# Patient Record
Sex: MALE | Race: BLACK OR AFRICAN AMERICAN | NOT HISPANIC OR LATINO | Employment: OTHER | URBAN - METROPOLITAN AREA
[De-identification: names, ages, dates, MRNs, and addresses within clinical notes are randomized per-mention and may not be internally consistent; named-entity substitution may affect disease eponyms.]

---

## 2023-08-23 ENCOUNTER — OFFICE VISIT (OUTPATIENT)
Dept: INTERNAL MEDICINE | Facility: CLINIC | Age: 68
End: 2023-08-23
Payer: MEDICARE

## 2023-08-23 VITALS
HEART RATE: 53 BPM | HEIGHT: 71 IN | DIASTOLIC BLOOD PRESSURE: 72 MMHG | BODY MASS INDEX: 32.07 KG/M2 | WEIGHT: 229.06 LBS | TEMPERATURE: 96 F | OXYGEN SATURATION: 99 % | SYSTOLIC BLOOD PRESSURE: 146 MMHG

## 2023-08-23 DIAGNOSIS — I10 PRIMARY HYPERTENSION: Primary | Chronic | ICD-10-CM

## 2023-08-23 DIAGNOSIS — M1A.9XX0 CHRONIC GOUT INVOLVING TOE WITHOUT TOPHUS, UNSPECIFIED CAUSE, UNSPECIFIED LATERALITY: Chronic | ICD-10-CM

## 2023-08-23 DIAGNOSIS — J30.9 ALLERGIC RHINITIS, UNSPECIFIED SEASONALITY, UNSPECIFIED TRIGGER: Chronic | ICD-10-CM

## 2023-08-23 DIAGNOSIS — E78.5 HYPERLIPIDEMIA, UNSPECIFIED HYPERLIPIDEMIA TYPE: Chronic | ICD-10-CM

## 2023-08-23 PROCEDURE — 99999 PR PBB SHADOW E&M-NEW PATIENT-LVL IV: ICD-10-PCS | Mod: PBBFAC,,,

## 2023-08-23 PROCEDURE — 99204 PR OFFICE/OUTPT VISIT, NEW, LEVL IV, 45-59 MIN: ICD-10-PCS | Mod: S$PBB,,, | Performed by: FAMILY MEDICINE

## 2023-08-23 PROCEDURE — 99999 PR PBB SHADOW E&M-NEW PATIENT-LVL IV: CPT | Mod: PBBFAC,,,

## 2023-08-23 PROCEDURE — 99204 OFFICE O/P NEW MOD 45 MIN: CPT | Mod: S$PBB,,, | Performed by: FAMILY MEDICINE

## 2023-08-23 PROCEDURE — 99204 OFFICE O/P NEW MOD 45 MIN: CPT | Mod: PBBFAC,PO | Performed by: FAMILY MEDICINE

## 2023-08-23 RX ORDER — ROSUVASTATIN CALCIUM 10 MG/1
10 TABLET, COATED ORAL DAILY
Qty: 90 TABLET | Refills: 2 | Status: SHIPPED | OUTPATIENT
Start: 2023-08-23 | End: 2024-02-23 | Stop reason: SDUPTHER

## 2023-08-23 RX ORDER — FLUTICASONE PROPIONATE 50 MCG
1 SPRAY, SUSPENSION (ML) NASAL
COMMUNITY

## 2023-08-23 RX ORDER — MULTIVIT-MINERALS/FOLIC ACID 120 MCG
2500 TABLET,CHEWABLE ORAL DAILY
COMMUNITY
Start: 2015-01-01

## 2023-08-23 RX ORDER — ROSUVASTATIN CALCIUM 10 MG/1
10 TABLET, COATED ORAL DAILY
COMMUNITY
Start: 2013-01-01 | End: 2023-08-23 | Stop reason: SDUPTHER

## 2023-08-23 RX ORDER — AMLODIPINE AND OLMESARTAN MEDOXOMIL 10; 40 MG/1; MG/1
1 TABLET ORAL DAILY
COMMUNITY
Start: 2023-04-11 | End: 2023-08-23 | Stop reason: SDUPTHER

## 2023-08-23 RX ORDER — ACETAMINOPHEN 500 MG
TABLET ORAL DAILY
COMMUNITY
Start: 2015-01-01

## 2023-08-23 RX ORDER — SPIRONOLACTONE 25 MG/1
25 TABLET ORAL DAILY
Qty: 90 TABLET | Refills: 2 | Status: SHIPPED | OUTPATIENT
Start: 2023-08-23 | End: 2024-02-23 | Stop reason: SDUPTHER

## 2023-08-23 RX ORDER — AMLODIPINE AND OLMESARTAN MEDOXOMIL 10; 40 MG/1; MG/1
1 TABLET ORAL DAILY
Qty: 90 TABLET | Refills: 2 | Status: SHIPPED | OUTPATIENT
Start: 2023-08-23 | End: 2024-02-23 | Stop reason: SDUPTHER

## 2023-08-23 RX ORDER — NEBIVOLOL 10 MG/1
10 TABLET ORAL DAILY
Qty: 90 TABLET | Refills: 2 | Status: SHIPPED | OUTPATIENT
Start: 2023-08-23

## 2023-08-23 RX ORDER — ASCORBIC ACID 250 MG
250 TABLET ORAL DAILY
COMMUNITY

## 2023-08-23 RX ORDER — SPIRONOLACTONE 25 MG/1
25 TABLET ORAL DAILY
COMMUNITY
Start: 2023-04-11 | End: 2023-08-23 | Stop reason: SDUPTHER

## 2023-08-23 RX ORDER — NEBIVOLOL 10 MG/1
10 TABLET ORAL DAILY
COMMUNITY
Start: 2023-04-11 | End: 2023-08-23 | Stop reason: SDUPTHER

## 2023-08-23 NOTE — PROGRESS NOTES
Subjective     Patient ID: Lorenzo Hou Sr. is a 68 y.o. male.    Chief Complaint: [facility]    69 yo male presents to establish care.  Patient with history of htn, hlp and gout. Patient reports no recent gout episodes but not eating shellfish, has tingling every now and then and if he stubs his toe it will hurt. Patient states he massages his foot with cbd oil  Review of Systems   Constitutional: Negative.    HENT: Negative.     Eyes: Negative.    Respiratory: Negative.     Cardiovascular: Negative.    Gastrointestinal: Negative.    Musculoskeletal: Negative.    Neurological: Negative.    Psychiatric/Behavioral: Negative.            Objective     Physical Exam  Vitals and nursing note reviewed.   Constitutional:       Appearance: Normal appearance.   HENT:      Head: Normocephalic and atraumatic.   Eyes:      Extraocular Movements: Extraocular movements intact.   Cardiovascular:      Rate and Rhythm: Normal rate and regular rhythm.   Pulmonary:      Effort: Pulmonary effort is normal.      Breath sounds: Normal breath sounds.   Abdominal:      General: Abdomen is flat. Bowel sounds are normal.      Palpations: Abdomen is soft.   Musculoskeletal:      Cervical back: Normal range of motion and neck supple.      Right lower leg: Edema present.      Left lower leg: Edema present.      Comments: Trace edema   Skin:     General: Skin is warm and dry.   Neurological:      General: No focal deficit present.      Mental Status: He is alert and oriented to person, place, and time.   Psychiatric:         Mood and Affect: Mood normal.         Behavior: Behavior normal.          Assessment and Plan     1. Primary hypertension  Comments:  bp STABLE. refill meds today cont current tx with bystolic and aldactone  Orders:  -     spironolactone (ALDACTONE) 25 MG tablet; Take 1 tablet (25 mg total) by mouth Daily.  Dispense: 90 tablet; Refill: 2  -     nebivoloL (BYSTOLIC) 10 MG Tab; Take 1 tablet (10 mg total) by mouth once  daily.  Dispense: 90 tablet; Refill: 2  -     amlodipine-olmesartan (BRIGIDA) 10-40 mg per tablet; Take 1 tablet by mouth once daily.  Dispense: 90 tablet; Refill: 2    2. Hyperlipidemia, unspecified hyperlipidemia type  Comments:  on statin therapy with crestor. per patient labs were good with cards  Orders:  -     rosuvastatin (CRESTOR) 10 MG tablet; Take 1 tablet (10 mg total) by mouth Daily.  Dispense: 90 tablet; Refill: 2    3. Allergic rhinitis, unspecified seasonality, unspecified trigger  Comments:  controlled with nasal spray refilled today  Orders:  -     cetirizine 10 mg Cap; Take 10 mg by mouth daily as needed (allergies).  Dispense: 90 capsule; Refill: 2    4. Chronic gout involving toe without tophus, unspecified cause, unspecified laterality  Comments:  pt states controlled with avoiding shellfish. has tingling on occassion but no severe flare ups              Follow up in about 6 months (around 2/23/2024).

## 2023-08-28 PROBLEM — E78.5 HYPERLIPIDEMIA: Status: ACTIVE | Noted: 2023-08-28

## 2023-08-28 PROBLEM — M1A.9XX0 CHRONIC GOUT INVOLVING TOE WITHOUT TOPHUS: Status: ACTIVE | Noted: 2023-08-28

## 2023-08-28 PROBLEM — I10 PRIMARY HYPERTENSION: Status: ACTIVE | Noted: 2023-08-28

## 2023-08-28 PROBLEM — J30.9 ALLERGIC RHINITIS: Status: ACTIVE | Noted: 2023-08-28

## 2023-08-31 ENCOUNTER — PATIENT MESSAGE (OUTPATIENT)
Dept: INTERNAL MEDICINE | Facility: CLINIC | Age: 68
End: 2023-08-31
Payer: MEDICARE

## 2024-02-21 DIAGNOSIS — I10 PRIMARY HYPERTENSION: ICD-10-CM

## 2024-02-23 ENCOUNTER — OFFICE VISIT (OUTPATIENT)
Dept: INTERNAL MEDICINE | Facility: CLINIC | Age: 69
End: 2024-02-23
Payer: MEDICARE

## 2024-02-23 DIAGNOSIS — E66.09 CLASS 1 OBESITY DUE TO EXCESS CALORIES WITH SERIOUS COMORBIDITY AND BODY MASS INDEX (BMI) OF 32.0 TO 32.9 IN ADULT: ICD-10-CM

## 2024-02-23 DIAGNOSIS — M1A.9XX0 CHRONIC GOUT INVOLVING TOE WITHOUT TOPHUS, UNSPECIFIED CAUSE, UNSPECIFIED LATERALITY: Chronic | ICD-10-CM

## 2024-02-23 DIAGNOSIS — E78.5 HYPERLIPIDEMIA, UNSPECIFIED HYPERLIPIDEMIA TYPE: Chronic | ICD-10-CM

## 2024-02-23 DIAGNOSIS — R00.1 BRADYCARDIA: Chronic | ICD-10-CM

## 2024-02-23 DIAGNOSIS — I10 PRIMARY HYPERTENSION: Primary | Chronic | ICD-10-CM

## 2024-02-23 PROCEDURE — 99213 OFFICE O/P EST LOW 20 MIN: CPT | Mod: 95,,, | Performed by: FAMILY MEDICINE

## 2024-02-23 RX ORDER — ROSUVASTATIN CALCIUM 10 MG/1
10 TABLET, COATED ORAL DAILY
Qty: 90 TABLET | Refills: 3 | Status: SHIPPED | OUTPATIENT
Start: 2024-02-23

## 2024-02-23 RX ORDER — AMLODIPINE AND OLMESARTAN MEDOXOMIL 10; 40 MG/1; MG/1
1 TABLET ORAL DAILY
Qty: 90 TABLET | Refills: 3 | Status: SHIPPED | OUTPATIENT
Start: 2024-02-23

## 2024-02-23 RX ORDER — SPIRONOLACTONE 25 MG/1
25 TABLET ORAL DAILY
Qty: 90 TABLET | Refills: 3 | Status: SHIPPED | OUTPATIENT
Start: 2024-02-23

## 2024-02-23 NOTE — PROGRESS NOTES
Subjective     Patient ID: Lorenzo Hou Sr. is a 69 y.o. male.    The patient location is: home  The chief complaint leading to consultation is: follow up    Visit type: audiovisual    Face to Face time with patient: 12  25 minutes of total time spent on the encounter, which includes face to face time and non-face to face time preparing to see the patient (eg, review of tests), Obtaining and/or reviewing separately obtained history, Documenting clinical information in the electronic or other health record, Independently interpreting results (not separately reported) and communicating results to the patient/family/caregiver, or Care coordination (not separately reported).         Each patient to whom he or she provides medical services by telemedicine is:  (1) informed of the relationship between the physician and patient and the respective role of any other health care provider with respect to management of the patient; and (2) notified that he or she may decline to receive medical services by telemedicine and may withdraw from such care at any time.    Notes:    Patient presents for follow up. Patient is doing well. Patient states his heart rate drops to the low 40s on occasion. Not walking as much as before and has been indulging in sweet potato pie. Patient denies any gout flares unless he bumps his toe. He avoids shellfish.    Hypertension  This is a recurrent problem. The current episode started more than 1 year ago. The problem has been resolved since onset. The problem is controlled. Pertinent negatives include no anxiety, blurred vision, chest pain, headaches, malaise/fatigue, neck pain, orthopnea, palpitations, peripheral edema, PND, shortness of breath or sweats. There are no associated agents to hypertension. Risk factors for coronary artery disease include family history. Past treatments include diuretics and lifestyle changes. The current treatment provides significant improvement. Compliance problems  include diet and exercise.      Review of Systems   Constitutional:  Negative for malaise/fatigue.   Eyes:  Negative for blurred vision.   Respiratory:  Negative for shortness of breath.    Cardiovascular:  Negative for chest pain, palpitations, orthopnea and PND.   Musculoskeletal:  Negative for neck pain.   Neurological:  Negative for headaches.          Objective     Physical Exam  Constitutional:       Appearance: Normal appearance.   HENT:      Head: Normocephalic and atraumatic.   Eyes:      Extraocular Movements: Extraocular movements intact.   Pulmonary:      Effort: Pulmonary effort is normal.   Neurological:      General: No focal deficit present.      Mental Status: He is alert.   Psychiatric:         Mood and Affect: Mood normal.            Assessment and Plan     1. Primary hypertension  Comments:  bp STABLE. refill meds today cont current tx with bystolic and aldactone  Orders:  -     spironolactone (ALDACTONE) 25 MG tablet; Take 1 tablet (25 mg total) by mouth Daily.  Dispense: 90 tablet; Refill: 3  -     amlodipine-olmesartan (BRIGIDA) 10-40 mg per tablet; Take 1 tablet by mouth once daily.  Dispense: 90 tablet; Refill: 3    2. Hyperlipidemia, unspecified hyperlipidemia type  Comments:  on statin therapy with crestor. per patient labs were good with cards  Orders:  -     rosuvastatin (CRESTOR) 10 MG tablet; Take 1 tablet (10 mg total) by mouth Daily.  Dispense: 90 tablet; Refill: 3    3. Bradycardia  Comments:  stable and managed by Dr. Robledo    4. Chronic gout involving toe without tophus, unspecified cause, unspecified laterality  Comments:  stable with diet control. patient not on meds by choice. will monitor uric acid levels to determine if meds need to be resumed    5. Class 1 obesity due to excess calories with serious comorbidity and body mass index (BMI) of 32.0 to 32.9 in adult  Comments:  discussed calorie reduction and increasing physical activity                 Follow up in about 3 months  (around 5/23/2024).

## 2024-03-30 VITALS
HEART RATE: 51 BPM | HEIGHT: 71 IN | WEIGHT: 229 LBS | SYSTOLIC BLOOD PRESSURE: 125 MMHG | TEMPERATURE: 98 F | DIASTOLIC BLOOD PRESSURE: 80 MMHG | BODY MASS INDEX: 32.06 KG/M2

## 2024-06-04 ENCOUNTER — PATIENT MESSAGE (OUTPATIENT)
Dept: INTERNAL MEDICINE | Facility: CLINIC | Age: 69
End: 2024-06-04
Payer: MEDICARE

## 2024-06-04 NOTE — TELEPHONE ENCOUNTER
See pt my chart message. Unable to review labs. Please give pt office fax number for office as he states he will fax if labs can't be viewed.//ddw

## 2024-08-26 ENCOUNTER — PATIENT MESSAGE (OUTPATIENT)
Dept: ADMINISTRATIVE | Facility: HOSPITAL | Age: 69
End: 2024-08-26
Payer: MEDICARE

## 2024-08-27 DIAGNOSIS — Z12.11 SCREENING FOR COLON CANCER: ICD-10-CM

## 2024-09-27 ENCOUNTER — PATIENT MESSAGE (OUTPATIENT)
Dept: ADMINISTRATIVE | Facility: HOSPITAL | Age: 69
End: 2024-09-27
Payer: MEDICARE

## 2024-09-30 DIAGNOSIS — Z12.5 SCREENING FOR PROSTATE CANCER: Primary | ICD-10-CM

## 2024-09-30 DIAGNOSIS — Z11.59 NEED FOR HEPATITIS C SCREENING TEST: ICD-10-CM

## 2024-09-30 DIAGNOSIS — Z13.1 SCREENING FOR DIABETES MELLITUS: ICD-10-CM

## 2024-09-30 DIAGNOSIS — E78.5 HYPERLIPIDEMIA, UNSPECIFIED HYPERLIPIDEMIA TYPE: ICD-10-CM

## 2024-09-30 DIAGNOSIS — R79.9 ABNORMAL FINDING OF BLOOD CHEMISTRY, UNSPECIFIED: ICD-10-CM

## 2024-10-01 ENCOUNTER — LAB VISIT (OUTPATIENT)
Dept: LAB | Facility: HOSPITAL | Age: 69
End: 2024-10-01
Attending: FAMILY MEDICINE
Payer: MEDICARE

## 2024-10-01 DIAGNOSIS — Z12.5 SCREENING FOR PROSTATE CANCER: ICD-10-CM

## 2024-10-01 DIAGNOSIS — I10 PRIMARY HYPERTENSION: ICD-10-CM

## 2024-10-01 DIAGNOSIS — E78.5 HYPERLIPIDEMIA, UNSPECIFIED HYPERLIPIDEMIA TYPE: ICD-10-CM

## 2024-10-01 DIAGNOSIS — R79.9 ABNORMAL FINDING OF BLOOD CHEMISTRY, UNSPECIFIED: ICD-10-CM

## 2024-10-01 DIAGNOSIS — Z13.1 SCREENING FOR DIABETES MELLITUS: ICD-10-CM

## 2024-10-01 DIAGNOSIS — Z11.59 NEED FOR HEPATITIS C SCREENING TEST: ICD-10-CM

## 2024-10-01 LAB
ALBUMIN SERPL BCP-MCNC: 4.2 G/DL (ref 3.5–5.2)
ALP SERPL-CCNC: 80 U/L (ref 55–135)
ALT SERPL W/O P-5'-P-CCNC: 19 U/L (ref 10–44)
ANION GAP SERPL CALC-SCNC: 4 MMOL/L (ref 8–16)
AST SERPL-CCNC: 16 U/L (ref 10–40)
BILIRUB SERPL-MCNC: 0.8 MG/DL (ref 0.1–1)
BUN SERPL-MCNC: 11 MG/DL (ref 8–23)
CALCIUM SERPL-MCNC: 9.9 MG/DL (ref 8.7–10.5)
CHLORIDE SERPL-SCNC: 105 MMOL/L (ref 95–110)
CHOLEST SERPL-MCNC: 123 MG/DL (ref 120–199)
CHOLEST/HDLC SERPL: 2.6 {RATIO} (ref 2–5)
CO2 SERPL-SCNC: 28 MMOL/L (ref 23–29)
COMPLEXED PSA SERPL-MCNC: 0.36 NG/ML (ref 0–4)
CREAT SERPL-MCNC: 1 MG/DL (ref 0.5–1.4)
EST. GFR  (NO RACE VARIABLE): >60 ML/MIN/1.73 M^2
ESTIMATED AVG GLUCOSE: 108 MG/DL (ref 68–131)
GLUCOSE SERPL-MCNC: 100 MG/DL (ref 70–110)
HBA1C MFR BLD: 5.4 % (ref 4–5.6)
HCV AB SERPL QL IA: NORMAL
HDLC SERPL-MCNC: 48 MG/DL (ref 40–75)
HDLC SERPL: 39 % (ref 20–50)
LDLC SERPL CALC-MCNC: 65.6 MG/DL (ref 63–159)
NONHDLC SERPL-MCNC: 75 MG/DL
POTASSIUM SERPL-SCNC: 4.6 MMOL/L (ref 3.5–5.1)
PROT SERPL-MCNC: 7.1 G/DL (ref 6–8.4)
SODIUM SERPL-SCNC: 137 MMOL/L (ref 136–145)
TRIGL SERPL-MCNC: 47 MG/DL (ref 30–150)

## 2024-10-01 PROCEDURE — 86803 HEPATITIS C AB TEST: CPT | Performed by: FAMILY MEDICINE

## 2024-10-01 PROCEDURE — 36415 COLL VENOUS BLD VENIPUNCTURE: CPT | Mod: PO | Performed by: FAMILY MEDICINE

## 2024-10-01 PROCEDURE — 83036 HEMOGLOBIN GLYCOSYLATED A1C: CPT | Performed by: FAMILY MEDICINE

## 2024-10-01 PROCEDURE — 80053 COMPREHEN METABOLIC PANEL: CPT | Performed by: FAMILY MEDICINE

## 2024-10-01 PROCEDURE — 80061 LIPID PANEL: CPT | Performed by: FAMILY MEDICINE

## 2024-10-01 PROCEDURE — 84153 ASSAY OF PSA TOTAL: CPT | Performed by: FAMILY MEDICINE

## 2024-10-21 ENCOUNTER — TELEPHONE (OUTPATIENT)
Dept: ADMINISTRATIVE | Facility: HOSPITAL | Age: 69
End: 2024-10-21
Payer: MEDICARE

## 2024-10-21 DIAGNOSIS — Z12.11 COLON CANCER SCREENING: Primary | ICD-10-CM

## 2024-10-21 NOTE — TELEPHONE ENCOUNTER
Lab stated they were unable to process the fitkit due to delay in mailing.  I am going to mail the patient another fitkit and place the order again.    Thanks  Luli Ramon  Panel Care Coordinator   [facility]active [facility] Encounter

## 2024-11-05 ENCOUNTER — OFFICE VISIT (OUTPATIENT)
Dept: URGENT CARE | Facility: CLINIC | Age: 69
End: 2024-11-05
Payer: MEDICARE

## 2024-11-05 ENCOUNTER — TELEPHONE (OUTPATIENT)
Dept: INTERNAL MEDICINE | Facility: CLINIC | Age: 69
End: 2024-11-05
Payer: MEDICARE

## 2024-11-05 VITALS
WEIGHT: 216 LBS | OXYGEN SATURATION: 98 % | HEIGHT: 70 IN | SYSTOLIC BLOOD PRESSURE: 132 MMHG | TEMPERATURE: 99 F | HEART RATE: 69 BPM | BODY MASS INDEX: 30.92 KG/M2 | DIASTOLIC BLOOD PRESSURE: 75 MMHG | RESPIRATION RATE: 16 BRPM

## 2024-11-05 DIAGNOSIS — J34.9 SINUS PROBLEM: Primary | ICD-10-CM

## 2024-11-05 DIAGNOSIS — R05.9 COUGH, UNSPECIFIED TYPE: ICD-10-CM

## 2024-11-05 LAB
CTP QC/QA: YES
SARS-COV-2 AG RESP QL IA.RAPID: NEGATIVE

## 2024-11-05 PROCEDURE — 99214 OFFICE O/P EST MOD 30 MIN: CPT | Mod: S$GLB,,, | Performed by: PHYSICIAN ASSISTANT

## 2024-11-05 PROCEDURE — 87811 SARS-COV-2 COVID19 W/OPTIC: CPT | Mod: QW,S$GLB,, | Performed by: PHYSICIAN ASSISTANT

## 2024-11-05 RX ORDER — BENZONATATE 200 MG/1
200 CAPSULE ORAL 3 TIMES DAILY PRN
Qty: 21 CAPSULE | Refills: 0 | Status: SHIPPED | OUTPATIENT
Start: 2024-11-05 | End: 2024-11-12

## 2024-11-05 NOTE — TELEPHONE ENCOUNTER
"I returned call to pt's wife , Pete. She states that both she and Mr Ventura has bad " colds" She started 5 days ago, and he started 2 days ago. She is improving but still has a deep cough, but Mr Ventura Coughing a lot and he is bringing up a lot of yellow/ green mucous. She informed me that when she saw she couldn't get an appointment today with Dr Mccormick, she went ahead and scheduled an appointment with Urgent Care to be seen today. I agreed with her and told her to let us know if there is anything we can do. Pt agrees.   "

## 2024-11-05 NOTE — PROGRESS NOTES
"Subjective:      Patient ID: Lorenzo Hou Sr. is a 69 y.o. male.    Vitals:  height is 5' 10" (1.778 m) and weight is 98 kg (216 lb). His oral temperature is 99.4 °F (37.4 °C). His blood pressure is 132/75 and his pulse is 69. His respiration is 16 and oxygen saturation is 98%.     Chief Complaint: Cough (Entered by patient)    Pt reports productive cough, congestion, sore throat, post nasal drip x 3 days. Denies known fever.  He denies fever, chills, ear pain, and/or any other symptoms associated with this complaint.    Cough  This is a new problem. The current episode started in the past 7 days. The problem has been gradually worsening. Associated symptoms include headaches, nasal congestion, postnasal drip, rhinorrhea and a sore throat. Pertinent negatives include no chest pain, chills, ear congestion, ear pain, fever, heartburn, hemoptysis, myalgias, rash, shortness of breath, sweats, weight loss or wheezing. Treatments tried: coricidin hbp. The treatment provided mild relief.       Constitution: Negative for chills and fever.   HENT:  Positive for postnasal drip and sore throat. Negative for ear pain.    Cardiovascular:  Negative for chest pain.   Respiratory:  Positive for cough. Negative for bloody sputum, shortness of breath and wheezing.    Gastrointestinal:  Negative for heartburn.   Musculoskeletal:  Negative for muscle ache.   Skin:  Negative for rash.   Neurological:  Positive for headaches.      Objective:     Physical Exam   Constitutional: He is oriented to person, place, and time. He appears well-developed.   HENT:   Head: Normocephalic and atraumatic.   Ears:   Right Ear: External ear normal.   Left Ear: External ear normal.   Nose: Nose normal.   Mouth/Throat: Oropharynx is clear and moist.   Eyes: Conjunctivae, EOM and lids are normal.   Neck: Trachea normal and phonation normal. Neck supple.   Cardiovascular: Normal rate.   Pulmonary/Chest: Effort normal. No respiratory distress. He has no " wheezes. He has no rhonchi.   Musculoskeletal: Normal range of motion.         General: Normal range of motion.   Neurological: He is alert and oriented to person, place, and time.   Skin: Skin is warm, dry and intact.   Psychiatric: His speech is normal and behavior is normal. Judgment and thought content normal.   Nursing note and vitals reviewed.      Assessment:     1. Sinus problem    2. Cough, unspecified type      Results for orders placed or performed in visit on 11/05/24   SARS Coronavirus 2 Antigen, POCT Manual Read    Collection Time: 11/05/24  9:43 AM   Result Value Ref Range    SARS Coronavirus 2 Antigen Negative Negative     Acceptable Yes        Plan:   VSS. Patient non-toxic appearing. Discussed medication being prescribed.  Advised patient to follow up with PCP as needed.  Patient verbalized understanding, agrees with the plan, and is comfortable with discharge.      Sinus problem  -     SARS Coronavirus 2 Antigen, POCT Manual Read    Cough, unspecified type  -     benzonatate (TESSALON) 200 MG capsule; Take 1 capsule (200 mg total) by mouth 3 (three) times daily as needed for Cough.  Dispense: 21 capsule; Refill: 0      Medical Decision Making:   Clinical Tests:   Lab Tests: Ordered and Reviewed       <> Summary of Lab: COVID negative

## 2024-11-05 NOTE — TELEPHONE ENCOUNTER
----- Message from Mirtha sent at 11/5/2024  6:48 AM CST -----  Contact: Pete/ Spouse  .Patient is calling to speak with the nurse regarding appt. Reports needing to come in today if possible due to the pt having a really bad cold. Please give patient a call back at   [phone].

## 2024-11-14 ENCOUNTER — OFFICE VISIT (OUTPATIENT)
Dept: INTERNAL MEDICINE | Facility: CLINIC | Age: 69
End: 2024-11-14
Payer: MEDICARE

## 2024-11-14 VITALS
SYSTOLIC BLOOD PRESSURE: 130 MMHG | HEART RATE: 58 BPM | WEIGHT: 218.06 LBS | TEMPERATURE: 99 F | DIASTOLIC BLOOD PRESSURE: 70 MMHG | BODY MASS INDEX: 31.28 KG/M2

## 2024-11-14 DIAGNOSIS — E78.2 MIXED HYPERLIPIDEMIA: Chronic | ICD-10-CM

## 2024-11-14 DIAGNOSIS — M1A.9XX0 CHRONIC GOUT INVOLVING TOE WITHOUT TOPHUS, UNSPECIFIED CAUSE, UNSPECIFIED LATERALITY: Chronic | ICD-10-CM

## 2024-11-14 DIAGNOSIS — I10 PRIMARY HYPERTENSION: Primary | Chronic | ICD-10-CM

## 2024-11-14 PROCEDURE — 99999 PR PBB SHADOW E&M-EST. PATIENT-LVL III: CPT | Mod: PBBFAC,,, | Performed by: FAMILY MEDICINE

## 2024-11-14 PROCEDURE — 99214 OFFICE O/P EST MOD 30 MIN: CPT | Mod: S$PBB,,, | Performed by: FAMILY MEDICINE

## 2024-11-14 PROCEDURE — 99213 OFFICE O/P EST LOW 20 MIN: CPT | Mod: PBBFAC,PO | Performed by: FAMILY MEDICINE

## 2024-11-19 ENCOUNTER — LAB VISIT (OUTPATIENT)
Dept: LAB | Facility: HOSPITAL | Age: 69
End: 2024-11-19
Payer: MEDICARE

## 2024-11-19 DIAGNOSIS — Z12.11 COLON CANCER SCREENING: ICD-10-CM

## 2024-11-19 LAB — HEMOCCULT STL QL IA: NEGATIVE

## 2024-11-19 PROCEDURE — 82274 ASSAY TEST FOR BLOOD FECAL: CPT | Performed by: FAMILY MEDICINE

## 2024-11-22 NOTE — PROGRESS NOTES
Subjective     Patient ID: Lorenzo Hou Sr. is a 69 y.o. male.    Chief Complaint: No chief complaint on file.    69 year old male presents for follow up on chronic conditions. Patient denies any recent gout attacks. He has been walking daily for exercise. He denies any acute problems. He is tolerating medications without issues      Review of Systems   Constitutional: Negative.    HENT: Negative.     Eyes: Negative.    Respiratory: Negative.     Cardiovascular: Negative.    Gastrointestinal: Negative.    Musculoskeletal: Negative.    Neurological: Negative.    Psychiatric/Behavioral: Negative.            Objective     Physical Exam  Vitals and nursing note reviewed.   Constitutional:       Appearance: Normal appearance. He is obese.   HENT:      Head: Normocephalic and atraumatic.   Eyes:      Extraocular Movements: Extraocular movements intact.      Conjunctiva/sclera: Conjunctivae normal.   Cardiovascular:      Rate and Rhythm: Normal rate and regular rhythm.   Pulmonary:      Effort: Pulmonary effort is normal.      Breath sounds: Normal breath sounds.   Musculoskeletal:      Right lower leg: No edema.      Left lower leg: No edema.   Skin:     General: Skin is warm and dry.   Neurological:      General: No focal deficit present.      Mental Status: He is alert and oriented to person, place, and time.   Psychiatric:         Mood and Affect: Mood normal.         Behavior: Behavior normal.            Assessment and Plan     1. Primary hypertension  Comments:  stable cont richard and bystolic and spironolactone    2. Mixed hyperlipidemia  Comments:  stable cont crestor    3. Chronic gout involving toe without tophus, unspecified cause, unspecified laterality  Comments:  stable and diet controlled                 Follow up in about 6 months (around 5/14/2025).

## 2024-11-29 ENCOUNTER — PATIENT MESSAGE (OUTPATIENT)
Dept: INTERNAL MEDICINE | Facility: CLINIC | Age: 69
End: 2024-11-29
Payer: MEDICARE

## 2025-01-03 DIAGNOSIS — I10 PRIMARY HYPERTENSION: Chronic | ICD-10-CM

## 2025-01-03 NOTE — TELEPHONE ENCOUNTER
No care due was identified.  Health [facility] Embedded Care Due Messages. Reference number: [id number].   1/03/2025 5:19:47 PM CST

## 2025-01-05 RX ORDER — AMLODIPINE AND OLMESARTAN MEDOXOMIL 10; 40 MG/1; MG/1
1 TABLET ORAL
Qty: 90 TABLET | Refills: 2 | Status: SHIPPED | OUTPATIENT
Start: 2025-01-05

## 2025-01-05 NOTE — TELEPHONE ENCOUNTER
Refill Decision Note   Lorenzo Hou  is requesting a refill authorization.  Brief Assessment and Rationale for Refill:  Approve     Medication Therapy Plan:         Comments:     Note composed:7:58 AM 01/05/2025

## 2025-01-12 DIAGNOSIS — E78.5 HYPERLIPIDEMIA, UNSPECIFIED HYPERLIPIDEMIA TYPE: Chronic | ICD-10-CM

## 2025-01-12 NOTE — TELEPHONE ENCOUNTER
No care due was identified.  Health [facility] Embedded Care Due Messages. Reference number: [id number].   1/12/2025 5:07:18 PM CST

## 2025-01-13 RX ORDER — ROSUVASTATIN CALCIUM 10 MG/1
10 TABLET, COATED ORAL
Qty: 90 TABLET | Refills: 2 | Status: SHIPPED | OUTPATIENT
Start: 2025-01-13 | End: 2025-01-29 | Stop reason: DRUGHIGH

## 2025-01-13 NOTE — TELEPHONE ENCOUNTER
Refill Decision Note   Lorenzo Hou  is requesting a refill authorization.  Brief Assessment and Rationale for Refill:  Approve     Medication Therapy Plan:         Comments:     Note composed:2:06 AM 01/13/2025

## 2025-01-14 ENCOUNTER — PATIENT MESSAGE (OUTPATIENT)
Dept: INTERNAL MEDICINE | Facility: CLINIC | Age: 70
End: 2025-01-14
Payer: MEDICARE

## 2025-01-14 DIAGNOSIS — E78.5 HYPERLIPIDEMIA, UNSPECIFIED HYPERLIPIDEMIA TYPE: Chronic | ICD-10-CM

## 2025-01-29 RX ORDER — ROSUVASTATIN CALCIUM 20 MG/1
20 TABLET, COATED ORAL DAILY
COMMUNITY
Start: 2025-01-13

## 2025-02-22 DIAGNOSIS — Z00.00 ENCOUNTER FOR MEDICARE ANNUAL WELLNESS EXAM: ICD-10-CM

## 2025-02-26 DIAGNOSIS — I10 PRIMARY HYPERTENSION: Chronic | ICD-10-CM

## 2025-02-26 RX ORDER — SPIRONOLACTONE 25 MG/1
25 TABLET ORAL
Qty: 90 TABLET | Refills: 2 | Status: SHIPPED | OUTPATIENT
Start: 2025-02-26

## 2025-02-26 NOTE — TELEPHONE ENCOUNTER
Refill Decision Note   Lorenzo Hou  is requesting a refill authorization.  Brief Assessment and Rationale for Refill:  Approve     Medication Therapy Plan:         Comments:     Note composed:12:19 PM 02/26/2025

## 2025-02-26 NOTE — TELEPHONE ENCOUNTER
No care due was identified.  [facility] Embedded Care Due Messages. Reference number: [id number].   2/26/2025 9:29:40 AM CST

## 2025-03-17 ENCOUNTER — OFFICE VISIT (OUTPATIENT)
Dept: INTERNAL MEDICINE | Facility: CLINIC | Age: 70
End: 2025-03-17
Payer: MEDICARE

## 2025-03-17 VITALS
HEIGHT: 70 IN | SYSTOLIC BLOOD PRESSURE: 142 MMHG | OXYGEN SATURATION: 95 % | WEIGHT: 213.63 LBS | HEART RATE: 64 BPM | TEMPERATURE: 97 F | RESPIRATION RATE: 18 BRPM | DIASTOLIC BLOOD PRESSURE: 88 MMHG | BODY MASS INDEX: 30.58 KG/M2

## 2025-03-17 DIAGNOSIS — I34.0 NONRHEUMATIC MITRAL (VALVE) INSUFFICIENCY: ICD-10-CM

## 2025-03-17 DIAGNOSIS — Z00.00 ENCOUNTER FOR MEDICARE ANNUAL WELLNESS EXAM: Primary | ICD-10-CM

## 2025-03-17 DIAGNOSIS — I10 PRIMARY HYPERTENSION: ICD-10-CM

## 2025-03-17 DIAGNOSIS — E78.2 MIXED HYPERLIPIDEMIA: ICD-10-CM

## 2025-03-17 DIAGNOSIS — I25.10 CORONARY ARTERY DISEASE INVOLVING NATIVE CORONARY ARTERY OF NATIVE HEART WITHOUT ANGINA PECTORIS: ICD-10-CM

## 2025-03-17 DIAGNOSIS — I13.0 HYPERTENSIVE HEART AND CHRONIC KIDNEY DISEASE WITH HEART FAILURE AND STAGE 1 THROUGH STAGE 4 CHRONIC KIDNEY DISEASE, OR UNSPECIFIED CHRONIC KIDNEY DISEASE: ICD-10-CM

## 2025-03-17 DIAGNOSIS — M1A.9XX0 CHRONIC GOUT INVOLVING TOE WITHOUT TOPHUS, UNSPECIFIED CAUSE, UNSPECIFIED LATERALITY: ICD-10-CM

## 2025-03-17 DIAGNOSIS — E66.9 OBESITY, UNSPECIFIED CLASS, UNSPECIFIED OBESITY TYPE, UNSPECIFIED WHETHER SERIOUS COMORBIDITY PRESENT: ICD-10-CM

## 2025-03-17 PROCEDURE — G0439 PPPS, SUBSEQ VISIT: HCPCS | Mod: ,,, | Performed by: NURSE PRACTITIONER

## 2025-03-17 PROCEDURE — 99999 PR PBB SHADOW E&M-EST. PATIENT-LVL IV: CPT | Mod: PBBFAC,,, | Performed by: NURSE PRACTITIONER

## 2025-03-17 PROCEDURE — 99214 OFFICE O/P EST MOD 30 MIN: CPT | Mod: PBBFAC | Performed by: NURSE PRACTITIONER

## 2025-03-17 NOTE — PROGRESS NOTES
"  Lorenzo Hou presented for a  Medicare AWV and comprehensive Health Risk Assessment today.  Patient accompanied by Wife , who was present throughout the visit and aided in information gathering.          Medical history  Family History  Social history  Allergies and Current Medications  Health Risk Assessment  Health Maintenance  Care Team         ** See Completed Assessments for Annual Wellness Visit within the encounter summary.**         The following assessments were completed:  Living Situation  CAGE  Depression Screening  Timed Get Up and Go  Whisper Test  Cognitive Function Screening  Nutrition Screening  ADL Screening  PAQ Screening      Opioid documentation:      Patient does not have a current opioid prescription.        Vitals:    03/17/25 1055   BP: (!) 142/88   Patient Position: Sitting   Pulse: 64   Resp: 18   Temp: 96.9 °F (36.1 °C)   SpO2: 95%   Weight: 96.9 kg (213 lb 10 oz)   Height: 5' 10" (1.778 m)     Body mass index is 30.65 kg/m².  Physical Exam  Vitals and nursing note reviewed.   Constitutional:       General: He is not in acute distress.     Appearance: He is well-developed.   HENT:      Head: Normocephalic and atraumatic.   Eyes:      Pupils: Pupils are equal, round, and reactive to light.   Neck:      Vascular: No carotid bruit.   Cardiovascular:      Rate and Rhythm: Normal rate and regular rhythm.      Pulses: Normal pulses.      Heart sounds: Normal heart sounds. No murmur heard.     No gallop.   Pulmonary:      Effort: Pulmonary effort is normal.      Breath sounds: Normal breath sounds.   Abdominal:      General: Bowel sounds are normal. There is no distension.      Palpations: Abdomen is soft.      Tenderness: There is no abdominal tenderness.   Musculoskeletal:         General: Normal range of motion.   Skin:     General: Skin is warm and dry.   Neurological:      Motor: No abnormal muscle tone.      Gait: Gait normal.   Psychiatric:         Speech: Speech normal.         " Behavior: Behavior normal.         Thought Content: Thought content normal.         Judgment: Judgment normal.       Current Outpatient Medications   Medication Instructions    amlodipine-olmesartan (BRIGIDA) 10-40 mg per tablet 1 tablet, Oral    ascorbic acid (vitamin C) (VITAMIN C) 250 mg, Daily    biotin 2,500 mcg, Daily    cetirizine 10 mg, Oral, Daily PRN    cholecalciferol, vitamin D3, (VITAMIN D3) 50 mcg (2,000 unit) Cap capsule Daily    fluticasone propionate (FLONASE) 50 mcg/actuation nasal spray 1 spray, As needed (PRN)    nebivoloL (BYSTOLIC) 10 mg, Oral, Daily    rosuvastatin (CRESTOR) 20 mg, Daily    spironolactone (ALDACTONE) 25 mg, Oral               Diagnoses and health risks identified today and associated recommendations/orders:    1. Encounter for Medicare annual wellness exam   Referral to Enhanced Annual Wellness Visit (eAWV) W+1    Pt to bring up date on vaccines  Has urine leakage ever interrupted your daily activites or sleep? No  Do you think you could use some help to better manage urine leakage?No      2. Coronary artery disease involving native coronary artery of native heart without angina pectoris  Ptca 1/ 2025- states 1 blocked artery no stents  Chronic and Stable on Crestor. Asa. Diet and exercicse. Continue current treatment plan as previously prescribed with yourcard- Dr. Robledo    3. Hypertensive heart and chronic kidney disease with heart failure and stage 1 through stage 4 chronic kidney disease, or unspecified chronic kidney disease  Chronic and Stable.Brigida, Bystolic,Aldactone and Continue current treatment plan as previously prescribed with your card    4. Nonrheumatic mitral (valve) insufficiency  Chronic and Stable.Brigida, Bystolic,Aldactone and Continue current treatment plan as previously prescribed with your card    5. Primary hypertension  Chronic and Stable.Brigida, Bystolic,Aldactone and Continue current treatment plan as previously prescribed with your card    6. Chronic gout  involving toe without tophus, unspecified cause, unspecified laterality  Chronic/stable - no recent flare up    7. Mixed hyperlipidemia  Chronic and Stable on Crestor. Asa. Diet and exercicse. Continue current treatment plan as previously prescribed with your PCP/card md    8 Obesity, unspecified class, unspecified obesity type, unspecified whether serious comorbidity   BMI 30. 6 KG  Discussed and recommend  low fat/carb/chol diet. Cardio exercise as tolerated. Life style modifications.      I offered to discuss advanced care planning, including how to pick a person who would make decisions for you if you were unable to make them for yourself, called a health care power of , and what kind of decisions you might make such as use of life sustaining treatments such as ventilators and tube feeding when faced with a life limiting illness recorded on a living will that they will need to know. (How you want to be cared for as you near the end of your natural life)     X  Patient has advanced directives written and agrees to provide copies to the institution.      Provided Lorenzo with a 5-10 year written screening schedule and personal prevention plan. Recommendations were developed using the USPSTF age appropriate recommendations. Education, counseling, and referrals were provided as needed. After Visit Summary printed and given to patient which includes a list of additional screenings\tests needed.    Follow up in about 1 year (around 3/17/2026).    Mara Mercado NP  I

## 2025-03-17 NOTE — PATIENT INSTRUCTIONS
Counseling and Referral of Other Preventative  (Italic type indicates deductible and co-insurance are waived)    Patient Name: Lorenzo Hou  Today's Date: 3/17/2025    Health Maintenance       Date Due Completion Date    Pneumococcal Vaccines (Age 50+) (1 of 1 - PCV) Never done ---    RSV Vaccine (Age 60+ and Pregnant patients) (1 - Risk 60-74 years 1-dose series) Never done ---    COVID-19 Vaccine (7 - 2024-25 season) 09/01/2024 10/20/2023    Colorectal Cancer Screening 11/19/2025 11/19/2024    High Dose Statin 03/17/2026 3/17/2025    TETANUS VACCINE 11/11/2026 11/11/2016    Hemoglobin A1c (Diabetic Prevention Screening) 10/01/2027 10/1/2024    Lipid Panel 10/01/2029 10/1/2024        No orders of the defined types were placed in this encounter.      The following information is provided to all patients.  This information is to help you find resources for any of the problems found today that may be affecting your health:                  Living healthy guide: www.[facility].[place].[facility]      Understanding Diabetes: www.diabetes.org      Eating healthy: www.cdc.gov/healthyweight      [facility] home safety checklist: www.cdc.gov/steadi/patient.html      Agency on Aging: www.goea.[place].gov      Alcoholics anonymous (AA): www.aa.org      Physical Activity: www.rose.nih.gov/gz2wiyf      Tobacco use: www.quitwithusla.org         Counseling and Referral of Other Preventative  (Italic type indicates deductible and co-insurance are waived)    Patient Name: Lorenzo Hou  Today's Date: 3/17/2025    Health Maintenance       Date Due Completion Date    Pneumococcal Vaccines (Age 50+) (1 of 1 - PCV) Never done ---    RSV Vaccine (Age 60+ and Pregnant patients) (1 - Risk 60-74 years 1-dose series) Never done ---    COVID-19 Vaccine (7 - 2024-25 season) 09/01/2024 10/20/2023    Colorectal Cancer Screening 11/19/2025 11/19/2024    High Dose Statin 03/17/2026 3/17/2025    TETANUS VACCINE 11/11/2026 11/11/2016    Hemoglobin A1c (Diabetic  Prevention Screening) 10/01/2027 10/1/2024    Lipid Panel 10/01/2029 10/1/2024        No orders of the defined types were placed in this encounter.      The following information is provided to all patients.  This information is to help you find resources for any of the problems found today that may be affecting your health:                  Living healthy guide: www.[facility].[place].[facility]      Understanding Diabetes: www.diabetes.org      Eating healthy: www.cdc.gov/healthyweight      [facility] home safety checklist: www.cdc.gov/steadi/patient.html      Agency on Aging: www.goea.[place]      Alcoholics anonymous (AA): www.aa.org      Physical Activity: www.rose.nih.gov/jg9ilhj      Tobacco use: www.quitwithusla.org

## 2025-03-24 ENCOUNTER — PATIENT MESSAGE (OUTPATIENT)
Dept: INTERNAL MEDICINE | Facility: CLINIC | Age: 70
End: 2025-03-24
Payer: MEDICARE

## 2025-03-26 ENCOUNTER — TELEPHONE (OUTPATIENT)
Dept: INTERNAL MEDICINE | Facility: CLINIC | Age: 70
End: 2025-03-26
Payer: MEDICARE

## 2025-03-28 ENCOUNTER — TELEPHONE (OUTPATIENT)
Dept: INTERNAL MEDICINE | Facility: CLINIC | Age: 70
End: 2025-03-28
Payer: MEDICARE

## 2025-03-28 NOTE — TELEPHONE ENCOUNTER
Pt is requesting Labs to be put in the system so he can come with his wife and get labs about a week before appt with Dr Mccormick for their Annual check ups. ( I am scheduling patient's at the [place] for 05/17/2025 .appointments ) He ahd his wife were scheduled to come in April had to be cancelled due to pt and wife being out out of town.

## 2025-05-17 ENCOUNTER — OFFICE VISIT (OUTPATIENT)
Dept: INTERNAL MEDICINE | Facility: CLINIC | Age: 70
End: 2025-05-17
Payer: MEDICARE

## 2025-05-17 VITALS
TEMPERATURE: 96 F | HEIGHT: 70 IN | BODY MASS INDEX: 30.46 KG/M2 | OXYGEN SATURATION: 99 % | DIASTOLIC BLOOD PRESSURE: 74 MMHG | HEART RATE: 52 BPM | WEIGHT: 212.75 LBS | RESPIRATION RATE: 18 BRPM | SYSTOLIC BLOOD PRESSURE: 121 MMHG

## 2025-05-17 DIAGNOSIS — I10 PRIMARY HYPERTENSION: Primary | Chronic | ICD-10-CM

## 2025-05-17 DIAGNOSIS — E78.2 MIXED HYPERLIPIDEMIA: Chronic | ICD-10-CM

## 2025-05-17 DIAGNOSIS — M1A.9XX0 CHRONIC GOUT INVOLVING TOE WITHOUT TOPHUS, UNSPECIFIED CAUSE, UNSPECIFIED LATERALITY: Chronic | ICD-10-CM

## 2025-05-17 DIAGNOSIS — I50.42 CHRONIC COMBINED SYSTOLIC (CONGESTIVE) AND DIASTOLIC (CONGESTIVE) HEART FAILURE: Chronic | ICD-10-CM

## 2025-05-17 DIAGNOSIS — I34.0 NONRHEUMATIC MITRAL (VALVE) INSUFFICIENCY: Chronic | ICD-10-CM

## 2025-05-17 DIAGNOSIS — I25.10 CORONARY ARTERY DISEASE INVOLVING NATIVE CORONARY ARTERY OF NATIVE HEART WITHOUT ANGINA PECTORIS: Chronic | ICD-10-CM

## 2025-05-17 PROCEDURE — 99214 OFFICE O/P EST MOD 30 MIN: CPT | Mod: S$PBB,,, | Performed by: FAMILY MEDICINE

## 2025-05-17 PROCEDURE — 99999 PR PBB SHADOW E&M-EST. PATIENT-LVL IV: CPT | Mod: PBBFAC,,, | Performed by: FAMILY MEDICINE

## 2025-05-17 PROCEDURE — G2211 COMPLEX E/M VISIT ADD ON: HCPCS | Mod: ,,, | Performed by: FAMILY MEDICINE

## 2025-05-17 PROCEDURE — 99214 OFFICE O/P EST MOD 30 MIN: CPT | Mod: PBBFAC | Performed by: FAMILY MEDICINE

## 2025-05-17 RX ORDER — ASPIRIN 81 MG/1
81 TABLET ORAL
COMMUNITY
Start: 2025-04-30

## 2025-06-09 PROBLEM — I50.42 CHRONIC COMBINED SYSTOLIC (CONGESTIVE) AND DIASTOLIC (CONGESTIVE) HEART FAILURE: Chronic | Status: ACTIVE | Noted: 2025-06-09

## 2025-06-09 NOTE — PROGRESS NOTES
Subjective     Patient ID: Lorenzo Hou Sr. is a 70 y.o. male.    Chief Complaint: Follow-up (3 mo), Hyperlipidemia, and Hypertension    History of Present Illness    PRESENTATION:  Lorenzo presents for a follow-up visit to discuss ongoing medical concerns, including a previously diagnosed mitral valve prolapse and potential surgical intervention.    HPI:  Lorenzo has mitral valve prolapse, which has progressed from mild to severe, as diagnosed by Dr. Robledo. Dr. Robledo recommended surgery to correct the condition. Lorenzo expresses significant hesitation about undergoing the procedure, citing fear and concerns about potential complications. He reports feeling well currently and has not had any limitations in his daily activities or exercise tolerance. Lorenzo mentions occasional mild edema behind his toes, which he manages by massaging the area and notes that this swelling tends to resolve spontaneously. He reports slight dizziness with postural changes. Lorenzo has a history of gout flare-ups, with shellfish being a known trigger. His last follow-up with Dr. Robledo was scheduled for July, but he has not returned since declining the surgery. Lorenzo expresses concerns about the risk of kidney problems post-surgery, stating he was informed by the surgeon of a 30% risk of kidney issues following open heart surgery.    He denies chest pain or shortness of breath.    MEDICAL HISTORY:  Lorenzo has a history of mitral valve prolapse, which has progressed from initially mild to moderate, and is now severe. He also has a history of gout, which flares up when he consumes shellfish.    FAMILY HISTORY:  Family history is significant for sister being on dialysis. His uncle had back surgery and later developed cancer in the same area, which ultimately led to his death.    IMAGING:  Lorenzo underwent an echocardiogram on an unspecified date. The results revealed a progression of mitral valve prolapse from mild to moderate, and now to  severe.    ALLERGIES:  Lorenzo reports an allergy to shellfish, which causes a flare-up. The specific reaction was not mentioned.      ROS:  General: -fever, -chills, -fatigue, -weight gain, -weight loss  Eyes: -vision changes, -redness, -discharge  ENT: -ear pain, -nasal congestion, -sore throat  Cardiovascular: -chest pain, -palpitations, -lower extremity edema, +exercise intolerance  Respiratory: -cough, -shortness of breath  Gastrointestinal: -abdominal pain, -nausea, -vomiting, -diarrhea, -constipation, -blood in stool  Genitourinary: -dysuria, -hematuria, -frequency  Musculoskeletal: -joint pain, -muscle pain, +limb swelling  Skin: -rash, -lesion  Neurological: -headache, +dizziness, -numbness, -tingling, +near-syncope, +lightheadedness  Psychiatric: -anxiety, -depression, -sleep difficulty            Objective     Physical Exam  Vitals and nursing note reviewed.   Constitutional:       Appearance: Normal appearance. He is obese.   HENT:      Head: Normocephalic and atraumatic.      Right Ear: Tympanic membrane, ear canal and external ear normal.      Left Ear: Tympanic membrane, ear canal and external ear normal.      Nose: Nose normal.      Mouth/Throat:      Mouth: Mucous membranes are moist.      Pharynx: Oropharynx is clear.   Eyes:      Extraocular Movements: Extraocular movements intact.      Conjunctiva/sclera: Conjunctivae normal.   Cardiovascular:      Rate and Rhythm: Normal rate and regular rhythm.   Pulmonary:      Effort: Pulmonary effort is normal.      Breath sounds: Normal breath sounds.   Abdominal:      General: Abdomen is flat. Bowel sounds are normal.      Palpations: Abdomen is soft.   Musculoskeletal:         General: Normal range of motion.      Cervical back: Normal range of motion and neck supple.      Right lower leg: No edema.      Left lower leg: No edema.   Skin:     General: Skin is warm and dry.   Neurological:      General: No focal deficit present.      Mental Status: He is  alert and oriented to person, place, and time.   Psychiatric:         Mood and Affect: Mood normal.         Behavior: Behavior normal.                Assessment and Plan     1. Primary hypertension  Overview:  Stable cont richard, bystolic, aldactone      2. Mixed hyperlipidemia  Overview:  Stable cont rosuvastatain      3. Coronary artery disease involving native coronary artery of native heart without angina pectoris  Overview:  Stable cont rosuvastatin and aspirin  Ptca 1/ 2025- states 1 blocked artery no stent       4. Chronic gout involving toe without tophus, unspecified cause, unspecified laterality  Overview:  Stable, diet controlled      5. Nonrheumatic mitral (valve) insufficiency  Comments:  stable, severe per cardiology, surgery recommended, patient has not decided    6. Chronic combined systolic (congestive) and diastolic (congestive) heart failure  Comments:  stable, without symptoms controlled on aldactone per cardiology        Assessment & Plan    Discussed significant mitral valve prolapse and surgical recommendation from Dr. Robledo.  Acknowledged patient's hesitancy about surgery due to concerns about potential complications and current lack of symptoms.  Considered recent loss of sister and its impact on medical decision-making.  Recognized importance of patient having peace of mind before proceeding with major cardiac procedure.  Explained renal complications are less likely when cardiac issues are addressed early.  Discussed age is not always the primary factor in surgical decisions, but rather quality of life and personal goals.    ACTION ITEMS/LIFESTYLE:   Lorenzo to prepare specific questions for Dr. Robledo regarding:   Projected timeline for symptom onset if surgery is delayed   Potential differences in recovery if surgery is performed after symptoms begin   Percentage of Dr. Robledo's patients who experienced renal complications post-op    FOLLOW UP:   Follow up with Dr. Robledo to discuss concerns  about mitral valve surgery and ask specific questions about timing, symptoms, and risks.              Follow up in about 6 months (around 11/17/2025) for Virtual Visit.    This note was generated with the assistance of ambient listening technology. Verbal consent was obtained by the patient and accompanying visitor(s) for the recording of patient appointment to facilitate this note. I attest to having reviewed and edited the generated note for accuracy, though some syntax or spelling errors may persist. Please contact the author of this note for any clarification.

## 2025-07-22 ENCOUNTER — PATIENT MESSAGE (OUTPATIENT)
Dept: RESEARCH | Facility: HOSPITAL | Age: 70
End: 2025-07-22
Payer: MEDICARE

## 2025-07-23 ENCOUNTER — PATIENT MESSAGE (OUTPATIENT)
Dept: INTERNAL MEDICINE | Facility: CLINIC | Age: 70
End: 2025-07-23
Payer: MEDICARE

## 2025-08-09 DIAGNOSIS — I10 PRIMARY HYPERTENSION: Chronic | ICD-10-CM

## 2025-08-10 RX ORDER — AMLODIPINE BESYLATE AND OLMESARTAN MEDOXOMIL 10; 40 MG/1; MG/1
1 TABLET ORAL
Qty: 90 TABLET | Refills: 0 | Status: SHIPPED | OUTPATIENT
Start: 2025-08-10